# Patient Record
Sex: FEMALE | Race: WHITE
[De-identification: names, ages, dates, MRNs, and addresses within clinical notes are randomized per-mention and may not be internally consistent; named-entity substitution may affect disease eponyms.]

---

## 2018-03-16 ENCOUNTER — HOSPITAL ENCOUNTER (OUTPATIENT)
Dept: HOSPITAL 62 - LAB | Age: 48
End: 2018-03-16
Attending: SURGERY
Payer: COMMERCIAL

## 2018-03-16 DIAGNOSIS — C50.912: Primary | ICD-10-CM

## 2018-03-16 PROCEDURE — 88342 IMHCHEM/IMCYTCHM 1ST ANTB: CPT

## 2018-03-16 PROCEDURE — 88341 IMHCHEM/IMCYTCHM EA ADD ANTB: CPT

## 2018-03-16 PROCEDURE — 88305 TISSUE EXAM BY PATHOLOGIST: CPT

## 2018-05-09 LAB
ANION GAP SERPL CALC-SCNC: 10 MMOL/L (ref 5–19)
BUN SERPL-MCNC: 16 MG/DL (ref 7–20)
CALCIUM: 10.2 MG/DL (ref 8.4–10.2)
CHLORIDE SERPL-SCNC: 104 MMOL/L (ref 98–107)
CO2 SERPL-SCNC: 29 MMOL/L (ref 22–30)
ERYTHROCYTE [DISTWIDTH] IN BLOOD BY AUTOMATED COUNT: 12.8 % (ref 11.5–14)
GLUCOSE SERPL-MCNC: 89 MG/DL (ref 75–110)
HCT VFR BLD CALC: 42.6 % (ref 36–47)
HGB BLD-MCNC: 14.4 G/DL (ref 12–15.5)
MCH RBC QN AUTO: 30.9 PG (ref 27–33.4)
MCHC RBC AUTO-ENTMCNC: 33.8 G/DL (ref 32–36)
MCV RBC AUTO: 92 FL (ref 80–97)
PLATELET # BLD: 266 10^3/UL (ref 150–450)
POTASSIUM SERPL-SCNC: 5.3 MMOL/L (ref 3.6–5)
RBC # BLD AUTO: 4.65 10^6/UL (ref 3.72–5.28)
SODIUM SERPL-SCNC: 143.4 MMOL/L (ref 137–145)
WBC # BLD AUTO: 6.5 10^3/UL (ref 4–10.5)

## 2018-05-16 ENCOUNTER — HOSPITAL ENCOUNTER (OUTPATIENT)
Dept: HOSPITAL 62 - OROUT | Age: 48
Discharge: HOME | End: 2018-05-16
Attending: SURGERY
Payer: COMMERCIAL

## 2018-05-16 VITALS — DIASTOLIC BLOOD PRESSURE: 82 MMHG | SYSTOLIC BLOOD PRESSURE: 124 MMHG

## 2018-05-16 DIAGNOSIS — Z87.891: ICD-10-CM

## 2018-05-16 DIAGNOSIS — R01.1: ICD-10-CM

## 2018-05-16 DIAGNOSIS — C50.912: Primary | ICD-10-CM

## 2018-05-16 PROCEDURE — 80048 BASIC METABOLIC PNL TOTAL CA: CPT

## 2018-05-16 PROCEDURE — 88342 IMHCHEM/IMCYTCHM 1ST ANTB: CPT

## 2018-05-16 PROCEDURE — 81025 URINE PREGNANCY TEST: CPT

## 2018-05-16 PROCEDURE — 19307 MAST MOD RAD: CPT

## 2018-05-16 PROCEDURE — 85027 COMPLETE CBC AUTOMATED: CPT

## 2018-05-16 PROCEDURE — A9520 TC99 TILMANOCEPT DIAG 0.5MCI: HCPCS

## 2018-05-16 PROCEDURE — 78195 LYMPH SYSTEM IMAGING: CPT

## 2018-05-16 PROCEDURE — 88307 TISSUE EXAM BY PATHOLOGIST: CPT

## 2018-05-16 PROCEDURE — 84132 ASSAY OF SERUM POTASSIUM: CPT

## 2018-05-16 PROCEDURE — 36415 COLL VENOUS BLD VENIPUNCTURE: CPT

## 2018-05-16 RX ADMIN — FENTANYL CITRATE ONE MCG: 50 INJECTION INTRAMUSCULAR; INTRAVENOUS at 15:55

## 2018-05-16 RX ADMIN — FENTANYL CITRATE ONE MCG: 50 INJECTION INTRAMUSCULAR; INTRAVENOUS at 16:05

## 2018-05-16 NOTE — RADIOLOGY REPORT (SQ)
EXAM DESCRIPTION:  NM LYMPHATICS/LYMPH GLANDS



COMPLETED DATE/TIME:  5/16/2018 11:47 am



REASON FOR STUDY:  BREAST CANCER C50.912  MALIGNANT NEOPLASM OF UNSPECIFIED SITE OF LEFT FEMAL



COMPARISON:  None.



RADIONUCLIDE AND DOSE:  620 microcuries TC-99m tilmanocept - [employer].

The route of agent administration:  Subcutaneous in the skin.



TECHNIQUE:  The skin of the left breast was prepped in sterile fashion.  The radiopharmaceutical was 
administered in equally divided doses in the periareolar breast, from the 12 o'clock position to the 
6 o'clock position.



LIMITATIONS:  None.



FINDINGS:  Images demonstrate activity at the injection site.  There is migration of activity towards
 the left axilla



IMPRESSION:  ADMINISTRATION OF RADIOPHARMACEUTICAL FOR SENTINEL LYMPH NODE EVALUATION.



TECHNICAL DOCUMENTATION:  JOB ID:  [id number]

 2011 [employer]- All Rights Reserved



Reading location - IP/workstation name: [email or web address]

## 2018-05-16 NOTE — OPERATIVE REPORT
Operative Report


DATE OF SURGERY: 05/16/18


PREOPERATIVE DIAGNOSIS: triple negative, poorly differentiated  left breast 

carcinoma


POSTOPERATIVE DIAGNOSIS: Same


OPERATION: 1.  Left mastectomy.  2.  Veblen lymph node biopsy 7 left axilla 

using dual mapping technique.  3.  Drainage of left chest wall


SURGEON: PIERRE RAMIRES


1ST ASSISTANT: VIOLA CUELLAR


ANESTHESIA: GA


TISSUE REMOVED OR ALTERED: Left breast; sentinel lymph nodes left axilla 7


COMPLICATIONS: 





None


ESTIMATED BLOOD LOSS: 75 cc


INTRAOPERATIVE FINDINGS: See below


PROCEDURE: 





The patient was seen in the preop holding area with the left breast was marked.

  Neoprobe scanning of the left axilla, and review of lymphoscintigraphy 

suggested successful migration of the nuclide into the left axilla.





The patient was taken to the main operating room where general anesthesia was 

induced.  The left arm was abducted left breast was, and the left breast 

prepped with alcohol, and injected with 2 cc of full-strength blue dye 

methylene blue 2 o'clock position areolar border intradermally.  The left 

breast was massaged in the left breast and axilla were prepped and draped in 

sterile fashion





Surgical plan and surgical technique were repeated.





The findings are significant for a very large breast tumor occupying 

approximately one half of the left breast lateral to mid aspect.  A generous 

marking was made on the skin for a planned elliptical incision.  Superior and 

inferior skin flaps were now raised after incising the skin with a #10 blade.  

We took the level of dissection down to the chest wall from the infraclavicular 

area to the lateral border of the chest wall, serratus anterior muscle 

inferiorly and peristernal tissue.  The breast was taken off of the chest wall 

with electrocautery.





We now had the tail of Edwards exposed and mobilized the lateral tissue flap to 

expose the lower axilla.  We commenced to harvest 7 sentinel lymph nodes.  All 

of these nodes were hot and the first 5 were blue as well.  Her all Level One 

in the lower axilla.  The first sentinel lymph node had an in vivo count of 

2801 and an ex vivo count of 7804, the second lymph node 12,171 ex vivo count 

9209, third sentinel lymph node ex vivo count of 4580, for 4 sentinel lymph 

node 3582 for fifth sentinel lymph node 2861, and the 6 sentinel lymph node 

2766 and the seventh 2801.  The latter 2 lymph nodes were hot but not blue.  

All the lymph nodes were medium to small in size and grossly appeared normal 

limits.





A large Michael drain was placed in the inferior skin flap and secured to skin 

with 2-0 Prolene suture.  We trimmed some redundant skin on the superior skin 

flap, medial aspect.  Hemostasis was excellent.  The arm was placed in a more 

anatomic position and the mastectomy incision closed running 2-0 Vicryl suture.

  Principal incision closed with Dermabond glue.  Patient tolerated procedure 

well, extubated, taken to recovery in stable condition.











The physician assistant, Ms. Judd, provided assistance during this case by: 

Assisting with, retracting tissue, instillation of local anesthesia and closure 

of skin incisions.

## 2018-05-16 NOTE — DISCHARGE SUMMARY
Discharge Summary (SDC)





- Discharge


Final Diagnosis: 





Left breast cancer


Date of Surgery: 05/16/18


Discharge Date: 05/16/18


Condition: Stable


Treatment or Instructions: 








 [facility]


 [address] [place] [id number]


 Phone: [phone]    Fax:  [phone]


 








 Care Instructions Following Your Mastectomy





Activities:


Resume normal activities when you feel comfortable.  It is best to remain as 

active as possible to speed your recovery. It is common to experience some 

fatigue after surgery and you may find that short naps are helpful.  Avoid 

strenuous activity such as weight lifting, tennis, etc at your surgical site 

for two weeks.  Perform gentle arm exercises daily and do not favor your 

operative arm to due increased risk of mobility issues postoperatively.  No 

driving for 7 days after surgery.  Do not drive if you are taking pain 

medication other than Tylenol or Ibuprofen.  No swimming, tub baths or soaking 

in a hot tub for 4 weeks. There are no dietary restrictions.  Do not smoke as 

this impairs wound healing.





Surgical Site care:


 Leave the steri-strips  in place.  You may shower after 48 hours- washing the 

wound with soap and water using your hands.   Do not scrub the incision. Pat 

the area dry with a towel. You do not need to recover the wound although some 

patients find that they feel more comfortable using a light dressing for a few 

days to absorb any minimal drainage which may occur.  Many patients also find 

that keeping a dressing around the drain exit site is helpful to absorb any 

drainage which may leak around the tubing.  If you use a dressing in this 

manner change it at least every day.  Do not use heating pad or apply an ice 

pack to the operative site.  You may apply deodorant if you are careful to 

avoid getting it on the wound itself. 





Empty the bulbs attached to the drain every 12 hours and measure the fluid 

output separately from each drain.  Please also strip each drain each time 

you empty it to prevent clogging.   Keep a record of the output and bring this 

record with you each time you come to the office for postoperative care. A 

drain is ready to be removed when its output is 30 mL per 24 hours per drain 

for 2 consecutive days.  Please call the office to inform our staff that you 

need to come in for drain removal.   





Medications:


Take Toradol 10mg one to tablets every six hours as needed for breakthrough 

pain.  Resume all of your normal prescription medications after your surgery 

unless instructed otherwise.





You may experience constipation after surgery while taking pain medications.  

If using a narcotic on a regular basis, take a stool softener such as Colace 

twice a day.  It is helpful to stay hydrated by drinking lots of fluids.  

Walking is also helpful and is good exercise after surgery.  If you need extra 

help, use Milk of Magnesia according to the directions on the package. 





Follow-up:


Call our office at [phone] to make a follow-up appointment in 10-14 

days.  Your doctor will call to discuss the pathology report with you as soon 

as it is available.   





Concerns:


If you had a sentinel lymph node biopsy with your mastectomy, your urine may 

have a greenish discoloration.  This is normal and will resolve as the blue dye 

slowly leaves your system.  If you notice significant leakage around the drains

, this is not normal.  The drains may be clogged.   Please call our office to 

come in immediately for the drains to be checked.  Some bruising may occur and 

will go away over time.  If you have a fever of 101.5 or greater, chills, 

redness at the incision site, excessive drainage from your wound or severe pain 

not relieved by pain medication, call your doctor.  A physician is available 24 

hours a day 7 days a week in addition to regular office hours.  If problems 

arise after normal office hours please call the hospital at [phone].  

Please call [phone] if you have any questions or concerns.  


Prescriptions: 


Ketorolac Tromethamine [Toradol 10 mg Tablet] 10 mg PO Q6HP PRN #20 tablet


 PRN Reason: 


Referrals: 


ROBIN BARRETT CNM [Primary Care Provider] - 


Discharge Diet: As Tolerated


Discharge Activity: No Lifting Over 10 Pounds, Walk Frequently


Report the Following to Your Physician Immediately: Fever over 101 Degrees, 

Unusual Bleeding, Redness, Drainage-Foul Smelling

## 2018-06-18 LAB
ERYTHROCYTE [DISTWIDTH] IN BLOOD BY AUTOMATED COUNT: 13.2 % (ref 11.5–14)
HCT VFR BLD CALC: 40.7 % (ref 36–47)
HGB BLD-MCNC: 13.6 G/DL (ref 12–15.5)
MCH RBC QN AUTO: 31.4 PG (ref 27–33.4)
MCHC RBC AUTO-ENTMCNC: 33.5 G/DL (ref 32–36)
MCV RBC AUTO: 94 FL (ref 80–97)
PLATELET # BLD: 257 10^3/UL (ref 150–450)
RBC # BLD AUTO: 4.35 10^6/UL (ref 3.72–5.28)
WBC # BLD AUTO: 7.7 10^3/UL (ref 4–10.5)

## 2018-06-20 ENCOUNTER — HOSPITAL ENCOUNTER (OUTPATIENT)
Dept: HOSPITAL 62 - OROUT | Age: 48
Discharge: HOME | End: 2018-06-20
Attending: SURGERY
Payer: COMMERCIAL

## 2018-06-20 VITALS — SYSTOLIC BLOOD PRESSURE: 125 MMHG | DIASTOLIC BLOOD PRESSURE: 74 MMHG

## 2018-06-20 DIAGNOSIS — R01.1: ICD-10-CM

## 2018-06-20 DIAGNOSIS — C50.912: Primary | ICD-10-CM

## 2018-06-20 DIAGNOSIS — Z01.818: ICD-10-CM

## 2018-06-20 DIAGNOSIS — Z87.891: ICD-10-CM

## 2018-06-20 LAB
APPEARANCE UR: CLEAR
APTT PPP: YELLOW S
BILIRUB UR QL STRIP: NEGATIVE
GLUCOSE UR STRIP-MCNC: NEGATIVE MG/DL
KETONES UR STRIP-MCNC: NEGATIVE MG/DL
NITRITE UR QL STRIP: NEGATIVE
PH UR STRIP: 6 [PH] (ref 5–9)
PROT UR STRIP-MCNC: NEGATIVE MG/DL
SP GR UR STRIP: 1.02
UROBILINOGEN UR-MCNC: NEGATIVE MG/DL (ref ?–2)

## 2018-06-20 PROCEDURE — 81025 URINE PREGNANCY TEST: CPT

## 2018-06-20 PROCEDURE — 36561 INSERT TUNNELED CV CATH: CPT

## 2018-06-20 PROCEDURE — 81001 URINALYSIS AUTO W/SCOPE: CPT

## 2018-06-20 PROCEDURE — 77001 FLUOROGUIDE FOR VEIN DEVICE: CPT

## 2018-06-20 PROCEDURE — 36415 COLL VENOUS BLD VENIPUNCTURE: CPT

## 2018-06-20 PROCEDURE — C1752 CATH,HEMODIALYSIS,SHORT-TERM: HCPCS

## 2018-06-20 PROCEDURE — 85027 COMPLETE CBC AUTOMATED: CPT

## 2018-06-20 PROCEDURE — C1788 PORT, INDWELLING, IMP: HCPCS

## 2018-06-20 NOTE — DISCHARGE SUMMARY
Discharge Summary (SDC)





- Discharge


Final Diagnosis: 





Left breast cancer


Date of Surgery: 06/20/18


Discharge Date: 06/20/18


Condition: Stable


Treatment or Instructions: 


WOUND CARE: 


You may shower in 48 hours. Leave steri strips intact. Warm water and soap may 

wash over wound, do not scrub. Pat dry. Cover if needed. 





PAIN MANAGEMENT:


Take one Toradol by  mouth every six hours as needed for pain.





FOLLOW UP:


follow up at [facility] in 7-10 days. Call clinic sooner with 

questions/concerns or if there is swelling, bleeding, redness, drainage, or 

difficulty breathing. 


Prescriptions: 


Ketorolac Tromethamine [Toradol 10 mg Tablet] 10 mg PO Q6HP PRN #20 tablet


 PRN Reason: 


Referrals: 


ROBIN BARRETT CNM [Primary Care Provider] - 


Discharge Diet: As Tolerated


Discharge Activity: Activity As Tolerated


Report the Following to Your Physician Immediately: Shortness of Breath, Fever 

over 101 Degrees, Unusual Bleeding, Redness, Swelling, Warmth, Increased 

Soreness, Drainage-Foul Smelling

## 2018-06-20 NOTE — OPERATIVE REPORT
Operative Report


DATE OF SURGERY: 06/20/18


PREOPERATIVE DIAGNOSIS: Triple negative breast cancer status post left 

mastectomy


POSTOPERATIVE DIAGNOSIS: Same


OPERATION: 1.  Focused ultrasound of the right neck.  2.  Ultrasound directed 

insertion of right catheter with single-chamber infusion port in right 

subclavian position.  3.  Interpretation of intraoperative fluoroscopy


SURGEON: PIERRE RAMIRES


1ST ASSISTANT: VIOLA CUELLAR


ANESTHESIA: LMAC


TISSUE REMOVED OR ALTERED: None


COMPLICATIONS: 





None


ESTIMATED BLOOD LOSS: Scant


INTRAOPERATIVE FINDINGS: See below


PROCEDURE: 





Informed consent was obtained.


 


The patient was placed in Trendelenburg the right neck and chest wall were 

exposed, and prepped and draped in a sterile fashion.  Surgical plan and 

surgical timeout discussed.


 


The right neck was anesthetized with 1% lidocaine without epinephrine.  Using 

the variable frequency linear transducer, real time, micro needle and wire were 

threaded into the right internal jugular vein.  A suitable site for placement 

of the right subclavian port was chosen.  Skin anesthetized 1% plain lidocaine 

and a 3 and half centimeter incision was made centimeters below the right 

clavicle.  Subcutaneous pocket was developed large enough to accommodate a 

single-chamber port with electrocautery and blunt finger dissection.  The 

catheter was then trimmed the appropriate length, tunneled between the 2 

incisions, and attempt to the port with the plastic ring 





We then switched the micro wire over to a conventional guidewire 0.030 using 

the micro introducer sheath.  The dilator and introducer sheath were threaded 

over the wire, dilator wire removed, catheter threaded the right internal 

jugular vein.  Strip away sheath was removed leaving the catheter in good 

position.  There was no kinking of the catheter based on fluoroscopic analysis.

  The tip of the catheter was in the superior vena cava right atrial junction.  

Catheter aspirated and flushed with heparinized saline.  Wounds closed with 2-0 

Vicryl suture benzoin Steri-Strips.





Patient tolerated procedure well and taken recovery in stable condition.

## 2018-06-20 NOTE — RADIOLOGY REPORT (SQ)
EXAM DESCRIPTION:  FLUORO/CV PLACEMENT



COMPLETED DATE/TIME:  6/20/2018 10:06 am



REASON FOR STUDY:  PORTACATH PLCMT RT SIDE ASST WITH FLUORO IN OR C50.912  MALIGNANT NEOPLASM OF UNSP
ECIFIED SITE OF LEFT FEMAL



COMPARISON:  None.



FLUOROSCOPY TIME:  Less than 10 seconds

3 digital radiographic C-arm images saved to PACS.



TECHNIQUE:  Intra-operative images acquired during surgical procedure to evaluate progress.

NUMBER OF IMAGES: 3 C-arm images saved to pac's



LIMITATIONS:  None.



FINDINGS:  Intra procedural imaging and fluoro during placement of a right-sided permanent central li
ne with the tip in the superior vena cava.  Please see the operative report for further details



IMPRESSION:  Intra procedural imaging and fluoro



COMMENT:  Quality :  Final reports for procedures using fluoroscopy that document radiation exp
osure indices, or exposure time and number of fluorographic images (if radiation exposure indices are
 not available)

Please consult full operative report of the attending physician for description of the procedure.



TECHNICAL DOCUMENTATION:  JOB ID:  [id number]

 2011 [email or web address]- All Rights Reserved



Reading location - IP/workstation name: [email or web address]

## 2018-06-26 ENCOUNTER — HOSPITAL ENCOUNTER (OUTPATIENT)
Dept: HOSPITAL 62 - RAD | Age: 48
End: 2018-06-26
Attending: INTERNAL MEDICINE
Payer: COMMERCIAL

## 2018-06-26 DIAGNOSIS — Z51.11: ICD-10-CM

## 2018-06-26 DIAGNOSIS — C50.412: Primary | ICD-10-CM

## 2018-06-26 PROCEDURE — A9538 TC99M PYROPHOSPHATE: HCPCS

## 2018-06-26 PROCEDURE — 78472 GATED HEART PLANAR SINGLE: CPT

## 2018-06-26 NOTE — RADIOLOGY REPORT (SQ)
EXAM DESCRIPTION:  NM MUGA REST



COMPLETED DATE/TIME:  6/26/2018 11:39 am



REASON FOR STUDY:  ENCTR FOR ANTINEOPLASTIC CHEMOTHERAPY (Z51.11), BREAST CA (C50.412) C50.412  MALIG
 NEOPLASM OF UPPER-OUTER QUADRANT OF LEFT FEMAL



COMPARISON:  None.



RADIONUCLIDE AND DOSE:  26.0 mCi technetium 99m labeled red blood cells

The route of agent administration: Intravenous



TECHNIQUE:  Following administration of the radionuclide, gated images of the heart are obtained in t
hree projections.  Left ventricular functional analysis performed.



LIMITATIONS:  None.



FINDINGS:   LEFT VENTRICULAR FUNCTION:

EJECTION FRACTION: 76%.

END-DIASTOLIC VOLUME: 73 mL.

END-SYSTOLIC VOLUME: 18 mL.

WALL MOTION: No focal wall motion abnormalities.

OTHER: No other significant finding.



IMPRESSION:  NORMAL CARDIAC MUGA STUDY.  NORMAL LEFT VENTRICULAR FUNCTION estimated at 76%



TECHNICAL DOCUMENTATION:  JOB ID:  [id number]

 2011 [employer]- All Rights Reserved



Reading location - IP/workstation name: [email or web address]